# Patient Record
(demographics unavailable — no encounter records)

---

## 2025-06-20 NOTE — PHYSICAL EXAM
[de-identified] : Examination of the right foot and ankle is as follows: Inspection: swelling, ecchymosis of foot and ankle, moderate swelling of dorsal foot and lateral ankle, but no abrasion, laceration, no erythema Palpation: ttp over distal fibula physis, no lateral ligament tenderness, no medial malleolus tenderness, no deltoid ligament tenderness ROM: plantarflexion 20 degrees, inversion 15 degrees, eversion 10 degrees, dorsiflexion 10 degrees Strength: dorsiflexion 4/5, plantar flexion 4/5, inversion 4/5, eversion 4/5, EHL 5/5, FHL 5/5 Vascular: dorsalis pedis pulse: 2+, posterior tibialis pulse: 2+ Neuro: Sensation present to light touch in all distributions Gait: antalgic, RLE short leg splint, patient ambulates with axillary crutches   X-rays of the right ankle is as follows: [facility] Ankle 3 view AP/Lateral/Oblique: nearly fully closed distal tibial physis, lateral malleolar partially open physis

## 2025-06-20 NOTE — ASSESSMENT
[FreeTextEntry1] : 14 yo male presenting today with right salter oakley I fracture of distal fibular physis. Recommend non-surgical management -RLE WBAT in short cam boot, rx given today. may ween off and d/c crutches to his tolerance -Avoid strenuous/impact related activities -Discussed with patient and patient's mother risk of pre-mature growth plate closure, understanding expressed  -Rest, ice, compression, elevation, NSAIDs PRN for pain. -All questions answered -F/u 1 month with repeat x-ray   The diagnosis was explained in detail. The potential non-surgical and surgical treatments were reviewed. The relative risks and benefits of each option were considered relative to the patients age, activity level, medical history, symptom severity and previously attempted treatments.   The patient was advised to consult with their primary medical provider prior to initiation of any new medications to reduce the risk of adverse effects specific to their long-term home medications and medical history.   The patient's current medications management of their orthopedic diagnosis was reviewed today:   1) We discussed a comprehensive treatment plan that included possible pharmaceutical management involving the use of prescription strength medications including but not limited to options as oral Naprosyn 500mg BID, once daily Meloxicam 15 mg, or 500-650 mg Tylenol versus over-the-counter oral medications and topical prescriptions NSAID Pennsaid vs over the counter Voltaren gel.   2) There is a moderate risk of morbidity with further treatment, especially from use of prescription strength medications and possible side effects of these medications which include upset stomach with oral medications, skin reactions to topical medications and cardiac/renal issues with long term use.   3) I recommended that the patient follow-up with their medical physician to discuss any significant specific potential issues with long term medication use such as interactions with current medications or with exacerbation of underlying medical comorbidities.   4) The benefits and risks associated with use of injectable, oral or topical, prescription and over the counter anti-inflammatory medications were discussed with the patient. The patient voiced understanding of the risks including but not limited to bleeding, stroke, kidney dysfunction, heart disease, and were referred to the black box warning label for further information  Entered by Jagjit Lomeli PA-C acting as scribe. Dr. Mcpherson Attestation The documentation recorded by the scribe, in my presence, accurately reflects the service I, Dr. Mcpherson, personally performed, and the decisions made by me with my edits as appropriate.

## 2025-06-20 NOTE — HISTORY OF PRESENT ILLNESS
[Sudden] : sudden [3] : 3 [0] : 0 [Dull/Aching] : dull/aching [Throbbing] : throbbing [Intermittent] : intermittent [Rest] : rest [Meds] : meds [de-identified] : Patient is here today for his right ankle. Pain began on 6/18/25. Patient states that he was playing ultimate frisbee when he jumped to catch the frisbee and landed his ankle in a divot in the ground. Patient reports intermittent sharp pain over the lateral ankle that is worse with movement. Patient went to [facility] on 6/18/25 where he had x-rays and was placed into short leg splint with axillary crutches. Patient denies taking medications for the pain. Patient came into the office into in short leg splint with axillary crutches accompanied by his mother.     [] : Post Surgical Visit: no [FreeTextEntry1] : Right ankle [FreeTextEntry3] : 6/18/25 [de-identified] : movement [de-identified] : 6/18/25 [de-identified] : [facility] [de-identified] : X-rays of right ankle at [facility] [de-identified] : [employer] 7th grade student

## 2025-07-17 NOTE — HISTORY OF PRESENT ILLNESS
[Sudden] : sudden [3] : 3 [0] : 0 [Dull/Aching] : dull/aching [Throbbing] : throbbing [Intermittent] : intermittent [Rest] : rest [Meds] : meds [de-identified] : Patient is here today for his right ankle follow up, being treated for right salter oakley I fracture of distal fibular physis. Patient reports no pain at this time, has remained in RLE CAM boot WBAT, and wearing Even Up LLE. Patient denies use of medication at this time for discomfort. Patient presents today with mother.  *FX Coded on 6/20/25*     [] : Post Surgical Visit: no [FreeTextEntry1] : Right ankle [FreeTextEntry3] : 6/18/25 [de-identified] : movement [de-identified] : 6/18/25 [de-identified] : [facility] [de-identified] : X-rays of right ankle at [facility] [de-identified] : [place] 7th grade student

## 2025-07-17 NOTE — ASSESSMENT
[FreeTextEntry1] : 12 yo male presenting today for f/u of right salter oakley I fracture of distal fibular physis treated non-surgically in short cam boot. Patient has no pain or complaints today, able to perform jumping jacks without difficulty. -RLE WBAT, d/c cam boot -Activities as tolerated, may begin to advance to his tolerance -Rest, ice, compression, elevation, NSAIDs PRN for pain. -All questions answered -F/u PRN   The diagnosis was explained in detail. The potential non-surgical and surgical treatments were reviewed. The relative risks and benefits of each option were considered relative to the patients age, activity level, medical history, symptom severity and previously attempted treatments.   The patient was advised to consult with their primary medical provider prior to initiation of any new medications to reduce the risk of adverse effects specific to their long-term home medications and medical history.   The patient's current medications management of their orthopedic diagnosis was reviewed today:   1) We discussed a comprehensive treatment plan that included possible pharmaceutical management involving the use of prescription strength medications including but not limited to options as oral Naprosyn 500mg BID, once daily Meloxicam 15 mg, or 500-650 mg Tylenol versus over-the-counter oral medications and topical prescriptions NSAID Pennsaid vs over the counter Voltaren gel.   2) There is a moderate risk of morbidity with further treatment, especially from use of prescription strength medications and possible side effects of these medications which include upset stomach with oral medications, skin reactions to topical medications and cardiac/renal issues with long term use.   3) I recommended that the patient follow-up with their medical physician to discuss any significant specific potential issues with long term medication use such as interactions with current medications or with exacerbation of underlying medical comorbidities.   4) The benefits and risks associated with use of injectable, oral or topical, prescription and over the counter anti-inflammatory medications were discussed with the patient. The patient voiced understanding of the risks including but not limited to bleeding, stroke, kidney dysfunction, heart disease, and were referred to the black box warning label for further information  Entered by Jagjit Lomeli PA-C acting as scribe. Dr. Mcpherson Attestation The documentation recorded by the scribe, in my presence, accurately reflects the service I, Dr. Mcpherson, personally performed, and the decisions made by me with my edits as appropriate.

## 2025-07-17 NOTE — PHYSICAL EXAM
[de-identified] : Examination of the right foot and ankle is as follows: Inspection: no swelling, no ecchymosis, no abrasion, laceration, no erythema Palpation: no ttp over distal fibula physis, no lateral ligament tenderness, no medial malleolus tenderness, no deltoid ligament tenderness ROM: plantarflexion 40 degrees, inversion 30 degrees, eversion 20 degrees, dorsiflexion 20 degrees Strength: dorsiflexion 5/5, plantar flexion 5/5, inversion 5/5, eversion 5/5, EHL 5/5, FHL 5/5 Vascular: dorsalis pedis pulse: 2+, posterior tibialis pulse: 2+ Neuro: Sensation present to light touch in all distributions Gait: non-antalgic, patient ambulates with cam boot without assistive devices    X-rays of the right ankle is as follows: [facility] Ankle 3 view AP/Lateral/Oblique: nearly fully closed distal tibial physis, lateral malleolar partially open physis